# Patient Record
Sex: MALE | Race: WHITE
[De-identification: names, ages, dates, MRNs, and addresses within clinical notes are randomized per-mention and may not be internally consistent; named-entity substitution may affect disease eponyms.]

---

## 2019-10-03 ENCOUNTER — HOSPITAL ENCOUNTER (INPATIENT)
Dept: HOSPITAL 95 - ER | Age: 64
LOS: 18 days | Discharge: HOME | DRG: 329 | End: 2019-10-21
Attending: SURGERY | Admitting: SURGERY
Payer: OTHER GOVERNMENT

## 2019-10-03 VITALS — BODY MASS INDEX: 21.19 KG/M2 | HEIGHT: 69.02 IN | WEIGHT: 143.08 LBS

## 2019-10-03 DIAGNOSIS — K91.89: ICD-10-CM

## 2019-10-03 DIAGNOSIS — C18.0: Primary | ICD-10-CM

## 2019-10-03 DIAGNOSIS — D64.9: ICD-10-CM

## 2019-10-03 DIAGNOSIS — R20.0: ICD-10-CM

## 2019-10-03 DIAGNOSIS — D47.3: ICD-10-CM

## 2019-10-03 DIAGNOSIS — G25.81: ICD-10-CM

## 2019-10-03 DIAGNOSIS — G45.9: ICD-10-CM

## 2019-10-03 DIAGNOSIS — R47.81: ICD-10-CM

## 2019-10-03 DIAGNOSIS — G93.9: ICD-10-CM

## 2019-10-03 DIAGNOSIS — R29.810: ICD-10-CM

## 2019-10-03 DIAGNOSIS — E03.9: ICD-10-CM

## 2019-10-03 DIAGNOSIS — K56.7: ICD-10-CM

## 2019-10-03 DIAGNOSIS — R53.1: ICD-10-CM

## 2019-10-03 DIAGNOSIS — K35.33: ICD-10-CM

## 2019-10-03 DIAGNOSIS — F17.210: ICD-10-CM

## 2019-10-03 PROCEDURE — C9113 INJ PANTOPRAZOLE SODIUM, VIA: HCPCS

## 2019-10-03 PROCEDURE — C1751 CATH, INF, PER/CENT/MIDLINE: HCPCS

## 2019-10-03 PROCEDURE — A9577 INJ MULTIHANCE: HCPCS

## 2019-10-03 NOTE — NUR
@1930
PATIENT ADMITTED FROM THE ER WITH HIS BROTHER. HE IS HAVING PAIN 8/10 IN HIS
LOW ABDOMEN/PELVIS. HE IS NOT CURRENTLY NAUSEATED, HE WOULD LIKE TO EAT
SOMETHING TONIGHT. ORIENTED TO ROOM AND NORMAL PROCEDURES TO EXPECT
THROUGHTOUT THE NIGHT. CALL LIGHT WITHIN REACH.
@2200 pATIENT CALLED RN TO ROOM WITH C/O 9/10 PAIN IN HIS PENIS AND RECTUM,
SUDDEN ONSET WHEN PATIENT ATTEMPTED TP PASS GAS. THE PAIN HAS NOT SUBSIDED
WITH TIME. MEDICATIONS GIVE WITH GOOD RELIEF.

## 2019-10-04 PROCEDURE — 0DTJ0ZZ RESECTION OF APPENDIX, OPEN APPROACH: ICD-10-PCS | Performed by: SURGERY

## 2019-10-04 PROCEDURE — 0WJG4ZZ INSPECTION OF PERITONEAL CAVITY, PERCUTANEOUS ENDOSCOPIC APPROACH: ICD-10-PCS | Performed by: SURGERY

## 2019-10-04 PROCEDURE — 0DTH0ZZ RESECTION OF CECUM, OPEN APPROACH: ICD-10-PCS | Performed by: SURGERY

## 2019-10-04 NOTE — NUR
ARRIVED BACK TO ROOM 232 FROM PACU S/P OPEN APPY PT HAS ROSA  WOUND VAC SMALL
AMT OF SEROSANG DRAINAGE PT REPORTS PAIN PRIOR TO MOVING 5/10 10/10 AFTER
MOVING INTO THE BED WITH ASSIST PT HAS NAUSEA TAKING SIPS ONLY OF H20 CL TRAY
ON HOLD UNILL NAUSEA IS BETTER MEDS GIVEN IN PACU

## 2019-10-04 NOTE — NUR
INTO [facility] VIA [employer]. PT REPORTS 2/10 ABDOMINAL PAIN. DENIES NAUSEA. HISTORY
AND ALLERGIES REVIEWED. NPO STATUS CONFIRMED. LUNGS WITH SCATTERED RHONCHI AND
WZ. RHONCHI CLEAR SOMEWHAT WITH COUGH-PT IS A SMOKER.

## 2019-10-04 NOTE — NUR
Advance directive education conducted. Patient is recovering from surgery but
still fairly alert with brother, Winston, bedside. Patient and Winston were
delighted to receive an advance directive booklet. Winston tells me he was in on
the process for his folks and his brother that passed away in fill out their
advance care planning forms. He stated that he knew its importance and how to
fill it out. He thanked me for providing the booklet.

## 2019-10-04 NOTE — NUR
pt awake reports pain 5/10 lower abd rad to r occ to the groin no nausea at
this time but stated he has had it off and on sched for or this am npo

## 2019-10-05 LAB
ANION GAP SERPL CALCULATED.4IONS-SCNC: 4 MMOL/L (ref 6–16)
BASOPHILS # BLD AUTO: 0.02 K/MM3 (ref 0–0.23)
BASOPHILS NFR BLD AUTO: 0 % (ref 0–2)
BUN SERPL-MCNC: 18 MG/DL (ref 8–24)
CALCIUM SERPL-MCNC: 7.7 MG/DL (ref 8.5–10.1)
CHLORIDE SERPL-SCNC: 105 MMOL/L (ref 98–108)
CO2 SERPL-SCNC: 27 MMOL/L (ref 21–32)
CREAT SERPL-MCNC: 0.92 MG/DL (ref 0.6–1.2)
DEPRECATED RDW RBC AUTO: 50.9 FL (ref 35.1–46.3)
EOSINOPHIL # BLD AUTO: 0 K/MM3 (ref 0–0.68)
EOSINOPHIL NFR BLD AUTO: 0 % (ref 0–6)
ERYTHROCYTE [DISTWIDTH] IN BLOOD BY AUTOMATED COUNT: 15.6 % (ref 11.7–14.2)
GLUCOSE SERPL-MCNC: 120 MG/DL (ref 70–99)
HCT VFR BLD AUTO: 23.6 % (ref 37–53)
HGB BLD-MCNC: 7.4 G/DL (ref 13.5–17.5)
IMM GRANULOCYTES # BLD AUTO: 0.04 K/MM3 (ref 0–0.1)
IMM GRANULOCYTES NFR BLD AUTO: 0 % (ref 0–1)
LYMPHOCYTES # BLD AUTO: 0.5 K/MM3 (ref 0.84–5.2)
LYMPHOCYTES NFR BLD AUTO: 4 % (ref 21–46)
MAGNESIUM SERPL-MCNC: 2 MG/DL (ref 1.6–2.4)
MCHC RBC AUTO-ENTMCNC: 31.4 G/DL (ref 31.5–36.5)
MCV RBC AUTO: 90 FL (ref 80–100)
MONOCYTES # BLD AUTO: 0.54 K/MM3 (ref 0.16–1.47)
MONOCYTES NFR BLD AUTO: 4 % (ref 4–13)
NEUTROPHILS # BLD AUTO: 12.18 K/MM3 (ref 1.96–9.15)
NEUTROPHILS NFR BLD AUTO: 92 % (ref 41–73)
NRBC # BLD AUTO: 0 K/MM3 (ref 0–0.02)
NRBC BLD AUTO-RTO: 0 /100 WBC (ref 0–0.2)
PHOSPHATE SERPL-MCNC: 2.3 MG/DL (ref 2.5–4.9)
PLATELET # BLD AUTO: 296 K/MM3 (ref 150–400)
POTASSIUM SERPL-SCNC: 4.3 MMOL/L (ref 3.5–5.5)
SODIUM SERPL-SCNC: 136 MMOL/L (ref 136–145)

## 2019-10-05 NOTE — NUR
SUMMARY
NO ACUTE CHANGES T/O SHIFT.  MEDICATED PER ORDERS T/O SHIFT FOR ABD PAIN.
TOLERATING CLEAR LIQUIDS, REPORTS HUNGRY BUT DENIES PASSING FLATUS.  BT
PRESENT.  PT AMBULATED IN NGO THIS AM AND HAS SAT IN RECLINER FOR T/O SHIFT.
JUNAID BAEZ THIS AM, PT REPORTS HAS VOIDED IN TOILET TWICE.  BROTHER AT
BEDSIDE.  CALL LIGHT IN REACH.  IV INFUSING W/O DIFFICULTY.

## 2019-10-06 NOTE — NUR
PAIN MANAGEMENT
PT REPORTS CONTINUOUS UNTOLERABLE ABD PAIN DESPITE CURRENT PAIN RELIEF
MEASURES AND MEDICATIONS. PT IS CURRENTLY AMBULATING IN HALLWAY AND REPORTS
PASSING FLATUS T/O THE MORNING. LEFT MESSAGE FOR PROVIDER TO DISCUSS PT'S
PAIN SITUATION.

## 2019-10-06 NOTE — NUR
SHIFT SUMMARY
PT A&0X4 WITH VSS TODAY. IND IN ROOM WITH STAND BY TO BATHROOM R/T IV AND
PAIN MEDS. REFUSES PAS STOCKINGS, NEW ORDER OBTAINED FOR LOVENOX.  VOIDING
WITHOUT DIFFICULTY AND REPORTS PASSING FLATUS T/O SHIFT; DECLINES BM.
AMBULATED TWICE IN THE HALLWAY TODAY. TOLERATING FL DIET; DENIES ANY N/V OR
ABD DISCOMFORT R/T INTAKE. ROSA DRESSING DRY AND INTACT W/ VAC IN PLACE; NO
NEW DRAINAGE NOTED. PAIN CONTROLLED WELL PER EMAR AND REPOSITIONING. BROTHER
AT BEDSIDE T/O THE AFTERNOON. CALL LIGHT WITHIN REACH AND ABLE TO USE
APPROPRIATELY.

## 2019-10-06 NOTE — NUR
SUMMARY
 PT UP IN ROOM SBA.VOIDING WITHOUT DIFF. MED THIS AM WITH TORADOL.PT USUALLY
RATES PAIN HIGH, BUT REPORTS LEVEL AS OK.PT ANXIOUS FOR DIET TO BE ADVANCED.
HAS PASSED FLATUS. DAY RN AGREES TO FOLLOW UP. PT NOT ON THINNERS. DAY RN WILL
FOLLOW UP ? THIS.PT REQUIRED NEW IV SITE THIS AM.

## 2019-10-07 LAB
ANION GAP SERPL CALCULATED.4IONS-SCNC: 3 MMOL/L (ref 6–16)
BASOPHILS # BLD AUTO: 0.01 K/MM3 (ref 0–0.23)
BASOPHILS NFR BLD AUTO: 0 % (ref 0–2)
BUN SERPL-MCNC: 11 MG/DL (ref 8–24)
CALCIUM SERPL-MCNC: 7.6 MG/DL (ref 8.5–10.1)
CHLORIDE SERPL-SCNC: 103 MMOL/L (ref 98–108)
CO2 SERPL-SCNC: 31 MMOL/L (ref 21–32)
CREAT SERPL-MCNC: 0.81 MG/DL (ref 0.6–1.2)
DEPRECATED RDW RBC AUTO: 50.2 FL (ref 35.1–46.3)
EOSINOPHIL # BLD AUTO: 0.03 K/MM3 (ref 0–0.68)
EOSINOPHIL NFR BLD AUTO: 0 % (ref 0–6)
ERYTHROCYTE [DISTWIDTH] IN BLOOD BY AUTOMATED COUNT: 15.3 % (ref 11.7–14.2)
GLUCOSE SERPL-MCNC: 99 MG/DL (ref 70–99)
HCT VFR BLD AUTO: 24.6 % (ref 37–53)
HGB BLD-MCNC: 7.8 G/DL (ref 13.5–17.5)
IMM GRANULOCYTES # BLD AUTO: 0.03 K/MM3 (ref 0–0.1)
IMM GRANULOCYTES NFR BLD AUTO: 0 % (ref 0–1)
LYMPHOCYTES # BLD AUTO: 1.04 K/MM3 (ref 0.84–5.2)
LYMPHOCYTES NFR BLD AUTO: 11 % (ref 21–46)
MAGNESIUM SERPL-MCNC: 1.8 MG/DL (ref 1.6–2.4)
MCHC RBC AUTO-ENTMCNC: 31.7 G/DL (ref 31.5–36.5)
MCV RBC AUTO: 89 FL (ref 80–100)
MONOCYTES # BLD AUTO: 0.57 K/MM3 (ref 0.16–1.47)
MONOCYTES NFR BLD AUTO: 6 % (ref 4–13)
NEUTROPHILS # BLD AUTO: 8.23 K/MM3 (ref 1.96–9.15)
NEUTROPHILS NFR BLD AUTO: 83 % (ref 41–73)
NRBC # BLD AUTO: 0 K/MM3 (ref 0–0.02)
NRBC BLD AUTO-RTO: 0 /100 WBC (ref 0–0.2)
PHOSPHATE SERPL-MCNC: 3.1 MG/DL (ref 2.5–4.9)
PLATELET # BLD AUTO: 361 K/MM3 (ref 150–400)
POTASSIUM SERPL-SCNC: 3.9 MMOL/L (ref 3.5–5.5)
SODIUM SERPL-SCNC: 137 MMOL/L (ref 136–145)

## 2019-10-07 NOTE — NUR
SHIFT SUMMARY
PT HAS BEEN UP TO BATHROOM TODAY AND SHOWERED. PAIN MANAGED WITH MEDS PER
ORDERS. TOLERATING FLUIDS WELL. DRESSINGS DRY AND INTACT. PT VOIDING, REPORTS
BM'S. PT HAD PRN INHALER/BREATHING TREATMENT WITH RT TODAY. HAS HX OF COPD. PT
AO, INDEPENDANT IN . HAD VISITORS TODAY. ASSISTED WITH ADL'S PRN.

## 2019-10-07 NOTE — NUR
SUMMARY
SLEP QUIETLY TONIGHT. REPORTS PAIN HAS HAD BETTER CONTROL TONIGHT.NO C/O
NAUSEA. VOIDING WITHOUT DIFF.

## 2019-10-08 NOTE — NUR
SHIFT SUMMARY
PT HAD VISITOR TODAY. HAS REPORTED PAIN CONTROLLED WITH PO PAIN MEDS. PT DID
HAVE SOME NAUSEA AFTER EATING SOLID FOOD. PO ZOFRAN GIVEN PER EMAR. PT REPORTS
THIS HELPED. PT IND IN RM. TOLERATING FLUIDS WELL AND VOIDING. ASSISTED WTIH
ADL'S PRN.

## 2019-10-08 NOTE — NUR
SUMMARY: POD 4 ILEOCECECTOMY. VSS, AFEBRILE, PASSING GAS AND SMALL LOOSE
STOOL. TOLERATING PO INTAKE AND PAIN WELL CONTROLLED WITH 10MG PO ROXICODONE.
ANTICIPATE DC HOME LATER THIS DAY.

## 2019-10-09 NOTE — NUR
pt reporting nausea still with the reg diet zofran given pre med before
breakfast pt reports abd pain 5/10 at this time will med after food to
decrease the nausea pt stated he is passing flatus abd had a bm

## 2019-10-10 NOTE — NUR
SHIFT SUMMARY
PT A&OX4 WITH VSS T/O SHIFT TODAY. REPORTS NOT PASSING FLATUS SINCE THIS
MORNING. NEW ORDERS OBTAINED AND MEDICATED PER EMAR. PT AMBULATED IND IN ROOM,
HALLWAYS, AND OUTSIDE TODAY. BROTHER AT BEDSIDE FOR MOST OF THE SHIFT. C/O
PAIN ONCE TODAY; PO PAIN MEDICATIONS GIVEN.

## 2019-10-10 NOTE — NUR
Pt Alert and oriented x4. VSS. COmplaints of mild abd pain and hiccups.
Passing gas but no BM. Given miralax; no results. Ambulating in room
independently. medicated as ordered.

## 2019-10-11 LAB
ANION GAP SERPL CALCULATED.4IONS-SCNC: 8 MMOL/L (ref 6–16)
BASOPHILS # BLD AUTO: 0.02 K/MM3 (ref 0–0.23)
BASOPHILS NFR BLD AUTO: 0 % (ref 0–2)
BUN SERPL-MCNC: 8 MG/DL (ref 8–24)
CALCIUM SERPL-MCNC: 8.3 MG/DL (ref 8.5–10.1)
CHLORIDE SERPL-SCNC: 97 MMOL/L (ref 98–108)
CO2 SERPL-SCNC: 30 MMOL/L (ref 21–32)
CREAT SERPL-MCNC: 0.87 MG/DL (ref 0.6–1.2)
DEPRECATED RDW RBC AUTO: 50.4 FL (ref 35.1–46.3)
EOSINOPHIL # BLD AUTO: 0.03 K/MM3 (ref 0–0.68)
EOSINOPHIL NFR BLD AUTO: 0 % (ref 0–6)
ERYTHROCYTE [DISTWIDTH] IN BLOOD BY AUTOMATED COUNT: 15.9 % (ref 11.7–14.2)
GLUCOSE SERPL-MCNC: 101 MG/DL (ref 70–99)
HCT VFR BLD AUTO: 29.7 % (ref 37–53)
HGB BLD-MCNC: 9.4 G/DL (ref 13.5–17.5)
IMM GRANULOCYTES # BLD AUTO: 0.13 K/MM3 (ref 0–0.1)
IMM GRANULOCYTES NFR BLD AUTO: 1 % (ref 0–1)
LYMPHOCYTES # BLD AUTO: 1.22 K/MM3 (ref 0.84–5.2)
LYMPHOCYTES NFR BLD AUTO: 10 % (ref 21–46)
MCHC RBC AUTO-ENTMCNC: 31.6 G/DL (ref 31.5–36.5)
MCV RBC AUTO: 86 FL (ref 80–100)
MONOCYTES # BLD AUTO: 0.71 K/MM3 (ref 0.16–1.47)
MONOCYTES NFR BLD AUTO: 6 % (ref 4–13)
NEUTROPHILS # BLD AUTO: 10.07 K/MM3 (ref 1.96–9.15)
NEUTROPHILS NFR BLD AUTO: 83 % (ref 41–73)
NRBC # BLD AUTO: 0 K/MM3 (ref 0–0.02)
NRBC BLD AUTO-RTO: 0 /100 WBC (ref 0–0.2)
PLATELET # BLD AUTO: 663 K/MM3 (ref 150–400)
POTASSIUM SERPL-SCNC: 4.3 MMOL/L (ref 3.5–5.5)
PROTHROMBIN TIME: 12.2 SEC (ref 9.7–11.5)
SODIUM SERPL-SCNC: 135 MMOL/L (ref 136–145)

## 2019-10-11 NOTE — NUR
PT ALERT AND ORIENTED. VSS. AMBULATING IN ROOM. ABDOMEN DISTENDED; PATIENT
COMPLAINT OF PAIN AND HICCUPS PASSING GAS. NO BM, DULCOLAX SUPP GIVEN;
AWAITING RESULTS.

## 2019-10-11 NOTE — NUR
SHIFT SUMMARY
HAD C/T TODAY THAT SHOWED ABSCESS. ORDERS OBTAINED FOR PERCUTANEOUS DRAIN.
CURRENTLY IN IMAGING FOR PLACEMENT. HAD ONE EPISODE OF SOB AND ANXIOUSNESS
THIS MORNING; O2 SATS STABLE AND RECEIVED BREATHING TREATMENT; NO NEW
COMPLAINTS. ON CL DIET. IND IN ROOM AND WITH VISITOR AT BEDSIDE FREQUENTLY T/O
SHIFT. HAD 2 BM TODAY.

## 2019-10-11 NOTE — NUR
PT ANXIOUS AND REPORTS SOB
PT REPORTED FEELING ANXIOUS AND SOB AT APPROX 1100 TODAY. PT WAS A&OX4, SPO2
AT 98% RA WITH RR OF 20. WHEEZES AUSCULTATED IN ISAIAH/RUL WITH COARSE CRACKLES
AT BASES. HOB ELEVATED, DEEP COUGH/BREATHS AND I.S. USE ENCOURAGED,  AND
BREATHING TREATMENT ORDERED. REPORTED "FEELING BETTER" AFTER BREATHING
TREATMENT AND DENIED ANY SOB. NO WHEEZING IN UPPER LOBES NOTED AT THIS TIME.
PT IS CURRENTLY SLEEPING IN BED.  WILL CONTINUE TO MONITOR.

## 2019-10-12 LAB
ANION GAP SERPL CALCULATED.4IONS-SCNC: 7 MMOL/L (ref 6–16)
BASOPHILS # BLD AUTO: 0.03 K/MM3 (ref 0–0.23)
BASOPHILS NFR BLD AUTO: 0 % (ref 0–2)
BUN SERPL-MCNC: 12 MG/DL (ref 8–24)
CALCIUM SERPL-MCNC: 8 MG/DL (ref 8.5–10.1)
CHLORIDE SERPL-SCNC: 101 MMOL/L (ref 98–108)
CO2 SERPL-SCNC: 28 MMOL/L (ref 21–32)
CREAT SERPL-MCNC: 0.78 MG/DL (ref 0.6–1.2)
DEPRECATED RDW RBC AUTO: 47.2 FL (ref 35.1–46.3)
EOSINOPHIL # BLD AUTO: 0.01 K/MM3 (ref 0–0.68)
EOSINOPHIL NFR BLD AUTO: 0 % (ref 0–6)
ERYTHROCYTE [DISTWIDTH] IN BLOOD BY AUTOMATED COUNT: 15.9 % (ref 11.7–14.2)
GLUCOSE SERPL-MCNC: 141 MG/DL (ref 70–99)
HCT VFR BLD AUTO: 25.8 % (ref 37–53)
HGB BLD-MCNC: 8.5 G/DL (ref 13.5–17.5)
IMM GRANULOCYTES # BLD AUTO: 0.17 K/MM3 (ref 0–0.1)
IMM GRANULOCYTES NFR BLD AUTO: 1 % (ref 0–1)
LYMPHOCYTES # BLD AUTO: 1.28 K/MM3 (ref 0.84–5.2)
LYMPHOCYTES NFR BLD AUTO: 9 % (ref 21–46)
MAGNESIUM SERPL-MCNC: 2.5 MG/DL (ref 1.6–2.4)
MCHC RBC AUTO-ENTMCNC: 32.9 G/DL (ref 31.5–36.5)
MCV RBC AUTO: 82 FL (ref 80–100)
MONOCYTES # BLD AUTO: 1 K/MM3 (ref 0.16–1.47)
MONOCYTES NFR BLD AUTO: 7 % (ref 4–13)
NEUTROPHILS # BLD AUTO: 11.64 K/MM3 (ref 1.96–9.15)
NEUTROPHILS NFR BLD AUTO: 82 % (ref 41–73)
NRBC # BLD AUTO: 0 K/MM3 (ref 0–0.02)
NRBC BLD AUTO-RTO: 0 /100 WBC (ref 0–0.2)
PHOSPHATE SERPL-MCNC: 2.8 MG/DL (ref 2.5–4.9)
PLATELET # BLD AUTO: 641 K/MM3 (ref 150–400)
POTASSIUM SERPL-SCNC: 3.8 MMOL/L (ref 3.5–5.5)
SODIUM SERPL-SCNC: 136 MMOL/L (ref 136–145)
TRIGL SERPL-MCNC: 175 MG/DL (ref 30–160)

## 2019-10-12 NOTE — NUR
SHIFT SUMMARY
PT IS IND IN RM. HAS REPORTED SOME PAIN TODAY AND WAS MEDICATED PER ORDERS.
TOLERATING CLEAR LIQUID DIET WELL. FAMILY HAS BEEN IN TO SEE PT.  WAS IN TO
SEE PT TODAY AND CHECKED DRAIN AND WOUND VAC. PT ASSISTED WITH ADL'S PRN.

## 2019-10-12 NOTE — NUR
SHIFT SUMMARY
PT POD#8. AAOX4. DISCOMFORT AT TOLERABLE LEVEL T/O NIGHT. NO NAUSEA/EMESIS.
ABD INCISION WITH ROSA SECURE/COMPRESSED WITH NO DRAINAGE. PERCUTANIOUS DRAIN
PLACE YESTARDAY, SCANT AMOUNT DRAINAGE NOTED. PT RESTED WELL T/O NIGHT.
CLINIMIX + IV ABX PER ORDERS. PT RESTING WELL THIS AM, CALL LIGHT IN REACH.

## 2019-10-13 LAB
MAGNESIUM SERPL-MCNC: 2.3 MG/DL (ref 1.6–2.4)
PHOSPHATE SERPL-MCNC: 3 MG/DL (ref 2.5–4.9)

## 2019-10-13 NOTE — NUR
SHIFT SUMMARY
PT WAS ADVANCED TO REGULAR DIET TODAY AND TOLERATED WELL. REPORTED NO NAUSEA
AFTER EATING. PAIN HAS BEEN MANAGED WITH MED PRN. PT HAS VOIDED AND HAD BM
TODAY. IND IN ROOM. PT HAD FAMILY IN TODAY. ASSISTED WT ADL'S PRN.

## 2019-10-13 NOTE — NUR
SHIFT SUMMARY:
JASE IS POD9 AFTER UNDERGOING AN APPENDECTOMY AND BOWEL RESECTION. HIS ROSA
WOUND VAC IS PATENT. HE HAS A DRAIN TO HIS LEFT LOWER FLANK WHICH HAS A SCANT
AMOUNT OF SEROSANGEINOUS FLUID. HE RATED HIS PAIN AT 7/10 FOR WHICH ROXICODONE
WAS EFFECTIVE. HE DENIES ANY NAUSEA OR VOMITING. HE IS INDEPENDENT IN HIS
ROOM. HE IS TOLERATING THE CLEAR LIQID DIET WELL. HE IS LYING IN BED WITH HIS
CALL LIGHT IN REACH.

## 2019-10-14 LAB
BASOPHILS # BLD AUTO: 0.05 K/MM3 (ref 0–0.23)
BASOPHILS NFR BLD AUTO: 0 % (ref 0–2)
DEPRECATED RDW RBC AUTO: 51.3 FL (ref 35.1–46.3)
EOSINOPHIL # BLD AUTO: 0.11 K/MM3 (ref 0–0.68)
EOSINOPHIL NFR BLD AUTO: 1 % (ref 0–6)
ERYTHROCYTE [DISTWIDTH] IN BLOOD BY AUTOMATED COUNT: 16.5 % (ref 11.7–14.2)
HCT VFR BLD AUTO: 26 % (ref 37–53)
HGB BLD-MCNC: 8.1 G/DL (ref 13.5–17.5)
IMM GRANULOCYTES # BLD AUTO: 0.21 K/MM3 (ref 0–0.1)
IMM GRANULOCYTES NFR BLD AUTO: 2 % (ref 0–1)
LYMPHOCYTES # BLD AUTO: 1.79 K/MM3 (ref 0.84–5.2)
LYMPHOCYTES NFR BLD AUTO: 13 % (ref 21–46)
MAGNESIUM SERPL-MCNC: 2.2 MG/DL (ref 1.6–2.4)
MCHC RBC AUTO-ENTMCNC: 31.2 G/DL (ref 31.5–36.5)
MCV RBC AUTO: 85 FL (ref 80–100)
MONOCYTES # BLD AUTO: 0.8 K/MM3 (ref 0.16–1.47)
MONOCYTES NFR BLD AUTO: 6 % (ref 4–13)
NEUTROPHILS # BLD AUTO: 10.64 K/MM3 (ref 1.96–9.15)
NEUTROPHILS NFR BLD AUTO: 78 % (ref 41–73)
NRBC # BLD AUTO: 0 K/MM3 (ref 0–0.02)
NRBC BLD AUTO-RTO: 0 /100 WBC (ref 0–0.2)
PHOSPHATE SERPL-MCNC: 3.5 MG/DL (ref 2.5–4.9)
PLATELET # BLD AUTO: 552 K/MM3 (ref 150–400)

## 2019-10-14 NOTE — NUR
SUMMARY: NO ACUTE CHANGE TODAY, VSS, A/O. PT INDEPENDENT IN ROOM. VOIDING AND
HAD BM TODAY. PAIN IS WELL MANAGED, PLAN IS POSSIBLE DISCHARGE TOMORROW.

## 2019-10-14 NOTE — NUR
SHIFT SUMMARY:
IRISH IS A&O X4, INDEPENDENT IN HIS ROOM AND TOLERATING THE REGULAR DIET
WELL. HE DENIES ANY N/V. HE IS ABLE TO MAKE HIS NEEDS KNOWN. HE COMPLAINS OF
8/10 PAIN THAT IS HELPED WITH THE ROXICODONE. HIS CALL LIGHT IS IN REACH. NO
ACUTE CHANGES THIS SHIFT.

## 2019-10-15 NOTE — NUR
PT VSS T/O NIGHT. INCISION CDI. PT MED FOR PAIN X1 W/REP RELIEF. PT DID HAVE 2
EPISODES OF N/V W/GREEN EMESIS. ABD SOFT, T-TUBE PUTTING OUT SMALL AMT SS
DNRG. PT REP +FLATUS, NO BM THIS SHIFT. PT UP INDEP IN ROOM, USING CALL LIGHT
FOR ASSISTANCE, IV ABX CONT PER ORDERS. WILL CONT TO MONITOR UNTIL REP GIVEN
TO ONCOMING RN.

## 2019-10-15 NOTE — NUR
AT ABOUT 1630 PT CALLED THIS RN INTO ROOM. PT REPORTS THAT HIS R CHEEK IS NUMB
AND THAT THE RIGHT SIDE OF HIS FACE IS DROOPING. FULL NEURO ASSESSMENT
COMPLETED WITHOUT ANY ACUTE FINDINGS. PT HAS EQUAL STRENGTH, PUPILS WNL, IS
A/O. DENIES ANY NUMBNESS/TINGLING EXCEPT IN R CHEEK. R FACIL DROOP AND SMALL
AMOUNT SLURRED SPEECH IS NOTED ON ASSESSMENT, BUT AS THIS RN IS SPEAKING WITH
THE PT, THE DROOP BECOMES LESS, PT SPEECH BECOMES MORE CLEAR AND PT REPORTS
FEELING COMING BACK INTO R CHEEK. VS ARE STABLE.
DR. PIRES MADE AWARE OF THIS AND HOSPITALIST, DR. SANDOVALTRATE CALLED FOR CONSULT.

## 2019-10-15 NOTE — NUR
SUMMARY CONTINUED: SURGICAL SITES WNL. PT INDEPENDENT IN ROOM.  ISTRATE
ABLE TO SEE PT AND IMAGING ORDERED, AWAITING RESULTS. WILL PASS REPORT TO BRIAN ABRAHAM.

## 2019-10-15 NOTE — NUR
SUMMARY: PT HAS HAD AN ODD DAY. SEE PREVIOUS NOTE. HAS HAD INTERMITTANT NAUSEA
ALL DAY AND REPORTS "FEELING SICK". HAS HAD A TOTAL OF 1800ML EMESIS. PT
REPORT IV ZOFRAN DOES NOT HELP WITH NAUSEA, DR. PIRES NOTIFIED OF PT NAUSEA
AND REGLAN ORDERED.

## 2019-10-16 LAB
ALBUMIN SERPL BCP-MCNC: 2.3 G/DL (ref 3.4–5)
ALBUMIN/GLOB SERPL: 0.5 {RATIO} (ref 0.8–1.8)
ALT SERPL W P-5'-P-CCNC: 44 U/L (ref 12–78)
ANION GAP SERPL CALCULATED.4IONS-SCNC: 8 MMOL/L (ref 6–16)
AST SERPL W P-5'-P-CCNC: 36 U/L (ref 12–37)
BASOPHILS # BLD AUTO: 0.07 K/MM3 (ref 0–0.23)
BASOPHILS NFR BLD AUTO: 1 % (ref 0–2)
BILIRUB SERPL-MCNC: 0.5 MG/DL (ref 0.1–1)
BUN SERPL-MCNC: 23 MG/DL (ref 8–24)
CALCIUM SERPL-MCNC: 8.9 MG/DL (ref 8.5–10.1)
CHLORIDE SERPL-SCNC: 97 MMOL/L (ref 98–108)
CO2 SERPL-SCNC: 31 MMOL/L (ref 21–32)
CREAT SERPL-MCNC: 1.06 MG/DL (ref 0.6–1.2)
DEPRECATED RDW RBC AUTO: 50.4 FL (ref 35.1–46.3)
EOSINOPHIL # BLD AUTO: 0.04 K/MM3 (ref 0–0.68)
EOSINOPHIL NFR BLD AUTO: 0 % (ref 0–6)
ERYTHROCYTE [DISTWIDTH] IN BLOOD BY AUTOMATED COUNT: 16.5 % (ref 11.7–14.2)
GLOBULIN SER CALC-MCNC: 5 G/DL (ref 2.2–4)
GLUCOSE SERPL-MCNC: 114 MG/DL (ref 70–99)
HCT VFR BLD AUTO: 29.7 % (ref 37–53)
HGB BLD-MCNC: 9.6 G/DL (ref 13.5–17.5)
IMM GRANULOCYTES # BLD AUTO: 0.14 K/MM3 (ref 0–0.1)
IMM GRANULOCYTES NFR BLD AUTO: 1 % (ref 0–1)
LYMPHOCYTES # BLD AUTO: 1.59 K/MM3 (ref 0.84–5.2)
LYMPHOCYTES NFR BLD AUTO: 12 % (ref 21–46)
MCHC RBC AUTO-ENTMCNC: 32.3 G/DL (ref 31.5–36.5)
MCV RBC AUTO: 84 FL (ref 80–100)
MONOCYTES # BLD AUTO: 0.91 K/MM3 (ref 0.16–1.47)
MONOCYTES NFR BLD AUTO: 7 % (ref 4–13)
NEUTROPHILS # BLD AUTO: 11.07 K/MM3 (ref 1.96–9.15)
NEUTROPHILS NFR BLD AUTO: 80 % (ref 41–73)
NRBC # BLD AUTO: 0 K/MM3 (ref 0–0.02)
NRBC BLD AUTO-RTO: 0 /100 WBC (ref 0–0.2)
PLATELET # BLD AUTO: 781 K/MM3 (ref 150–400)
POTASSIUM SERPL-SCNC: 3.7 MMOL/L (ref 3.5–5.5)
PROT SERPL-MCNC: 7.3 G/DL (ref 6.4–8.2)
SODIUM SERPL-SCNC: 136 MMOL/L (ref 136–145)

## 2019-10-16 NOTE — NUR
PT VSS T/O NIGHT. PAIN MGD W/PO PAIN MEDS W/REP RELIEF. PT CONT TO HAVE NAUSEA
W/2 EPISODES OF SM AMT EMESIS. PT REFUSING NASUEA MEDS, STATING "I THINK THEY
MAKE IT WORSE" PT REP +FLATUS NAD 1 LARGE BM THIS SHIFT. PO INTAKE MINIMAL,
CLINIMIX CONT PER ORDERS. MINIMAL SS DRNG FROM T-TUBE. WILL CONT TO MONITOR
UNTIL REP GIVEN TO ONCOMING RN.

## 2019-10-16 NOTE — NUR
pt ref meds for bm stated that he had two bm yesterday 1 lg 1 small 1 episode
of emesis this am offered zofran pt declined offered tums stated ok pt stated
no residual symptoms from yesterday had a droop for several min and garbled
speech no other symptoms pt pig tail drain has red liquid drainage small amt
some abd dist

## 2019-10-16 NOTE — NUR
dr morris by to see pt going over dx/tx and mri results pt brother also there
pt's sister req to be called

## 2019-10-17 LAB
BASOPHILS # BLD: 0 K/MM3 (ref 0–0.23)
BASOPHILS NFR BLD: 0 % (ref 0–2)
DEPRECATED RDW RBC AUTO: 51.3 FL (ref 35.1–46.3)
EOSINOPHIL # BLD: 0 K/MM3 (ref 0–0.68)
EOSINOPHIL NFR BLD: 0 % (ref 0–6)
ERYTHROCYTE [DISTWIDTH] IN BLOOD BY AUTOMATED COUNT: 16.6 % (ref 11.7–14.2)
HCT VFR BLD AUTO: 28.2 % (ref 37–53)
HGB BLD-MCNC: 9.1 G/DL (ref 13.5–17.5)
LYMPHOCYTES # BLD: 1.9 K/MM3 (ref 0.84–5.2)
LYMPHOCYTES NFR BLD: 15 % (ref 21–46)
MAGNESIUM SERPL-MCNC: 2.5 MG/DL (ref 1.6–2.4)
MCHC RBC AUTO-ENTMCNC: 32.3 G/DL (ref 31.5–36.5)
MCV RBC AUTO: 86 FL (ref 80–100)
METAMYELOCYTES # BLD MANUAL: 0.12 K/MM3 (ref 0–0)
METAMYELOCYTES NFR BLD MANUAL: 1 % (ref 0–0)
MONOCYTES # BLD: 1.52 K/MM3 (ref 0.16–1.47)
MONOCYTES NFR BLD: 12 % (ref 4–13)
NEUTS BAND NFR BLD MANUAL: 1 % (ref 0–8)
NEUTS SEG # BLD MANUAL: 9.16 K/MM3 (ref 1.96–9.15)
NEUTS SEG NFR BLD MANUAL: 71 % (ref 41–73)
NRBC # BLD AUTO: 0 K/MM3 (ref 0–0.02)
NRBC BLD AUTO-RTO: 0 /100 WBC (ref 0–0.2)
PHOSPHATE SERPL-MCNC: 4.4 MG/DL (ref 2.5–4.9)
PLATELET # BLD AUTO: 820 K/MM3 (ref 150–400)
TOTAL CELLS COUNTED BLD: 100

## 2019-10-17 NOTE — NUR
rounding:
dr in to see patient and make some medication changes. stool softeners held
r/t multiple soft bm's. ct of chest ordered for today. am labs ordered.

## 2019-10-17 NOTE — NUR
ASSESSMENT:
PT SLEEPING. RESP E/U. NO S/S PAIN OR DISTRESS. CALL LIGHT IN REACH. WILL
ALLOW REST AND FULLY ASSESS WHEN PT IS AWAKE.

## 2019-10-17 NOTE — NUR
SHIFT SUMMARY:
IRISH IS RECOVERING AFTER UNDERGOING SURGERY FOR A PERFORATED APPENDIX WITH
CONVERSION TO OPEN PROCEDURE WITH ILEOCECECTOMY. THAT SURGERY WAS PERFORMED
10/04/19. AN ABSCESS DEVELOPED AND A DRAIN WAS PLACED 10/11/19. HE DENIES
VOMITING AT THIS TIME. HE DOES REPORT SOME MILD NAUSEA BUT HIS MAIN COMPLAINT
WAS EPIGASTRIC PAIN WHICH WAS RESOLVED WITH A GI COCKTAIL. HIS ABDOMINAL
INCISION IS MARANDA, NO DRAINAGE OR S/S OF INFECTION. VSS. HE IS LYING IN BED WITH
HIS CALL LIGHT IN REACH. HE IS INDEPENDENT IN THE ROOM.

## 2019-10-17 NOTE — NUR
WOUND: DR PIRES IN TO SEE PATIENT. STAPLES REMOVED FROM SURGICAL SITES PER
VERBAL ORDER. PT NATHAN WELL.

## 2019-10-17 NOTE — NUR
PT HAS BEEN STABLE THIS SHIFT. PT HAD CT OF CHEST THIS AM. PT IN [place]
ISOLATION R/T R/O OF TB, LAB PENDING. SPUTUM SENT FOR [employer], BACTERIA AND
FUNGUS. PT HAS WHEEZES T/O LUNGS WITH OCCASIONAL DRY COUGH. PT UP INDEP IN
ROOM. PT CONT TO SMOKE. STAPLES REMOVED FROM SURGICAL SITES. PT HAS POOR
APPETITE, CONT CLINIMIX. VOIDING WELL WITH BR PRIVILEGES. PROTONIX STARTED FOR
GERD COMPLAINTS. NO NAUSEA OR EMESIS THIS SHIFT. BROTHER AT BEDSIDE. USES CALL
LIGHT APPROPRIATELY AS NEEDED.

## 2019-10-17 NOTE — NUR
RESPIRATORY:
PT HAS BEEN PLACED IN [place] ISOLATION FOR TB PRECAUTIONS. EXPLAINED TO
FAMILY AND PATIENT. PULMONOLOGY CONSULT CALLED.

## 2019-10-18 LAB
ANION GAP SERPL CALCULATED.4IONS-SCNC: 7 MMOL/L (ref 6–16)
BASOPHILS # BLD: 0 K/MM3 (ref 0–0.23)
BASOPHILS NFR BLD: 0 % (ref 0–2)
BUN SERPL-MCNC: 26 MG/DL (ref 8–24)
CALCIUM SERPL-MCNC: 8.3 MG/DL (ref 8.5–10.1)
CHLORIDE SERPL-SCNC: 104 MMOL/L (ref 98–108)
CO2 SERPL-SCNC: 25 MMOL/L (ref 21–32)
CREAT SERPL-MCNC: 0.81 MG/DL (ref 0.6–1.2)
DEPRECATED RDW RBC AUTO: 52.3 FL (ref 35.1–46.3)
EOSINOPHIL # BLD: 0.12 K/MM3 (ref 0–0.68)
EOSINOPHIL NFR BLD: 1 % (ref 0–6)
ERYTHROCYTE [DISTWIDTH] IN BLOOD BY AUTOMATED COUNT: 16.9 % (ref 11.7–14.2)
GLUCOSE SERPL-MCNC: 108 MG/DL (ref 70–99)
HCT VFR BLD AUTO: 25.1 % (ref 37–53)
HGB BLD-MCNC: 8 G/DL (ref 13.5–17.5)
LYMPHOCYTES # BLD: 2.28 K/MM3 (ref 0.84–5.2)
LYMPHOCYTES NFR BLD: 17 % (ref 21–46)
MCHC RBC AUTO-ENTMCNC: 31.9 G/DL (ref 31.5–36.5)
MCV RBC AUTO: 85 FL (ref 80–100)
MONOCYTES # BLD: 0.6 K/MM3 (ref 0.16–1.47)
MONOCYTES NFR BLD: 5 % (ref 4–13)
NEUTS SEG # BLD MANUAL: 9.02 K/MM3 (ref 1.96–9.15)
NEUTS SEG NFR BLD MANUAL: 75 % (ref 41–73)
NRBC # BLD AUTO: 0 K/MM3 (ref 0–0.02)
NRBC BLD AUTO-RTO: 0 /100 WBC (ref 0–0.2)
PLATELET # BLD AUTO: 773 K/MM3 (ref 150–400)
POTASSIUM SERPL-SCNC: 4.2 MMOL/L (ref 3.5–5.5)
SODIUM SERPL-SCNC: 136 MMOL/L (ref 136–145)
TOTAL CELLS COUNTED BLD: 100
VARIANT LYMPHS NFR BLD MANUAL: 2 % (ref 0–0)

## 2019-10-18 NOTE — NUR
pt resting meds given earlier protonix and decadron npo for bronch with
pulomonology pt stated no nausea this am feels better no drainage per t tube
drain

## 2019-10-18 NOTE — NUR
SECOND SPUTUM SAMPLE SENT TO LAB NO SALIVA ALSO SL IV PT EATING AND
DRINKING WELL NO NAUSEA ABX CHANGE TO PO

## 2019-10-18 NOTE — NUR
pulmonologist and dr brown by to see pt called day surg to cancel bronch per
his req will cont to try to obtain sputum samples need 2 more 24 hr apart per
dr brown pt may d/c home surg cleared but must remain until samples are given
t tube drain removed earlier dr garsia came by re tb skin test while he is
here

## 2019-10-18 NOTE — NUR
Patient alert and oriented. VSS. Ambulating to bathroom. voiding freely. No
drainage from drain. No complaints of pain. NPO since midnight. Will hold 0600
oral meds pending Endoscopy in A.M. Sputum sample collect and sent to lab.

## 2019-10-19 LAB
BASOPHILS # BLD AUTO: 0.01 K/MM3 (ref 0–0.23)
BASOPHILS NFR BLD AUTO: 0 % (ref 0–2)
DEPRECATED RDW RBC AUTO: 54.5 FL (ref 35.1–46.3)
EOSINOPHIL # BLD AUTO: 0.01 K/MM3 (ref 0–0.68)
EOSINOPHIL NFR BLD AUTO: 0 % (ref 0–6)
ERYTHROCYTE [DISTWIDTH] IN BLOOD BY AUTOMATED COUNT: 17.1 % (ref 11.7–14.2)
GAMMA INTERFERON BACKGROUND BLD IA-ACNC: 0.03 IU/ML
HCT VFR BLD AUTO: 27.5 % (ref 37–53)
HGB BLD-MCNC: 8.6 G/DL (ref 13.5–17.5)
IMM GRANULOCYTES # BLD AUTO: 0.05 K/MM3 (ref 0–0.1)
IMM GRANULOCYTES NFR BLD AUTO: 1 % (ref 0–1)
LYMPHOCYTES # BLD AUTO: 1.04 K/MM3 (ref 0.84–5.2)
LYMPHOCYTES NFR BLD AUTO: 10 % (ref 21–46)
M TB IFN-G CD4+ BCKGRND COR BLD-ACNC: 0.03 IU/ML
M TB IFN-G CD4+CD8+ BCKGRND COR BLD-ACNC: 0.03 IU/ML
MCHC RBC AUTO-ENTMCNC: 31.3 G/DL (ref 31.5–36.5)
MCV RBC AUTO: 88 FL (ref 80–100)
MITOGEN IGNF BLD-ACNC: 0.84 IU/ML
MONOCYTES # BLD AUTO: 0.27 K/MM3 (ref 0.16–1.47)
MONOCYTES NFR BLD AUTO: 3 % (ref 4–13)
NEUTROPHILS # BLD AUTO: 8.68 K/MM3 (ref 1.96–9.15)
NEUTROPHILS NFR BLD AUTO: 86 % (ref 41–73)
NRBC # BLD AUTO: 0 K/MM3 (ref 0–0.02)
NRBC BLD AUTO-RTO: 0 /100 WBC (ref 0–0.2)
PLATELET # BLD AUTO: 706 K/MM3 (ref 150–400)

## 2019-10-19 NOTE — NUR
SUMMARY: NO ACUTE CHANGE TODAY, VSS,A/0. NO S/SX OF TIA. MEDICATED FOR PAIN
PRN PER EMAR. PT HAS DENIED NAUSEA AND HAS TAKEN SERVERAL WALKS. TOLERATING
REG DIET. NO CONCERNS AT THIS TIME, PLAN IS DC ON MONDAY. WILL PASS REPORT TO
BRIAN ABRAHAM.

## 2019-10-20 LAB
BASOPHILS # BLD AUTO: 0.01 K/MM3 (ref 0–0.23)
BASOPHILS NFR BLD AUTO: 0 % (ref 0–2)
DEPRECATED RDW RBC AUTO: 52.8 FL (ref 35.1–46.3)
EOSINOPHIL # BLD AUTO: 0.01 K/MM3 (ref 0–0.68)
EOSINOPHIL NFR BLD AUTO: 0 % (ref 0–6)
ERYTHROCYTE [DISTWIDTH] IN BLOOD BY AUTOMATED COUNT: 17 % (ref 11.7–14.2)
HCT VFR BLD AUTO: 29.5 % (ref 37–53)
HGB BLD-MCNC: 9.3 G/DL (ref 13.5–17.5)
IMM GRANULOCYTES # BLD AUTO: 0.03 K/MM3 (ref 0–0.1)
IMM GRANULOCYTES NFR BLD AUTO: 0 % (ref 0–1)
LYMPHOCYTES # BLD AUTO: 1.96 K/MM3 (ref 0.84–5.2)
LYMPHOCYTES NFR BLD AUTO: 20 % (ref 21–46)
MCHC RBC AUTO-ENTMCNC: 31.5 G/DL (ref 31.5–36.5)
MCV RBC AUTO: 85 FL (ref 80–100)
MONOCYTES # BLD AUTO: 0.51 K/MM3 (ref 0.16–1.47)
MONOCYTES NFR BLD AUTO: 5 % (ref 4–13)
NEUTROPHILS # BLD AUTO: 7.39 K/MM3 (ref 1.96–9.15)
NEUTROPHILS NFR BLD AUTO: 75 % (ref 41–73)
NRBC # BLD AUTO: 0 K/MM3 (ref 0–0.02)
NRBC BLD AUTO-RTO: 0 /100 WBC (ref 0–0.2)
PLATELET # BLD AUTO: 771 K/MM3 (ref 150–400)

## 2019-10-20 NOTE — NUR
SHIFT SUMMARY
 
LYING IN SEMI FOWLERS WITH EYES CLOSED.  HAS DONE WELL THIS SHIFT.  MEDICATED
FOR PAIN X1.  EDUCATED ON DISEASE PROCESS AND POC, VOICES UNDERSTANDING.
DENEIS FURTHER NEEDS OR WANTS AT THIS TIME.  SAFETY MEASURES IN PLACE.  WILL
GIVE HAND OFF TO ONCOMING SHIFT USING SBAR.

## 2019-10-20 NOTE — NUR
SUMMARY: NO CHANGE TODAY. PT CONTINUES TO BE IN GOOD SPIRITS. BM TODAY. PAIN
MANAGED, TOLERATING DIET, FREQUENT WALKS TODAY TO SMOKE. VSS. PT IS CLEARED BY
SURGERY AND PLAN IS FOR DISCHARGE TOMORROW OF WHICH PT IS AWARE AND HAS MADE
ARRAGEMENTS. REPORT GIVEN TO JARAD MANSFIELD.

## 2019-10-21 LAB
BASOPHILS # BLD AUTO: 0.01 K/MM3 (ref 0–0.23)
BASOPHILS NFR BLD AUTO: 0 % (ref 0–2)
DEPRECATED RDW RBC AUTO: 52.4 FL (ref 35.1–46.3)
EOSINOPHIL # BLD AUTO: 0 K/MM3 (ref 0–0.68)
EOSINOPHIL NFR BLD AUTO: 0 % (ref 0–6)
ERYTHROCYTE [DISTWIDTH] IN BLOOD BY AUTOMATED COUNT: 16.9 % (ref 11.7–14.2)
HCT VFR BLD AUTO: 26.9 % (ref 37–53)
HGB BLD-MCNC: 8.4 G/DL (ref 13.5–17.5)
IMM GRANULOCYTES # BLD AUTO: 0.05 K/MM3 (ref 0–0.1)
IMM GRANULOCYTES NFR BLD AUTO: 1 % (ref 0–1)
LYMPHOCYTES # BLD AUTO: 1.55 K/MM3 (ref 0.84–5.2)
LYMPHOCYTES NFR BLD AUTO: 16 % (ref 21–46)
MCHC RBC AUTO-ENTMCNC: 31.2 G/DL (ref 31.5–36.5)
MCV RBC AUTO: 85 FL (ref 80–100)
MONOCYTES # BLD AUTO: 0.65 K/MM3 (ref 0.16–1.47)
MONOCYTES NFR BLD AUTO: 7 % (ref 4–13)
NEUTROPHILS # BLD AUTO: 7.65 K/MM3 (ref 1.96–9.15)
NEUTROPHILS NFR BLD AUTO: 77 % (ref 41–73)
NRBC # BLD AUTO: 0 K/MM3 (ref 0–0.02)
NRBC BLD AUTO-RTO: 0 /100 WBC (ref 0–0.2)
PLATELET # BLD AUTO: 695 K/MM3 (ref 150–400)

## 2019-10-21 NOTE — NUR
SHIFT SUMMARY
 
LYING IN SEMI FOWLERS WITH EYES CLOSED.  HAS DONE WELL THIS SHIFT.  DENIES
FURTHER NEEDS OR WANTS AT THIS TIME.  STATES THAT HE IS READY TO GO HOME.
SAFETY MEASURES IN PLACE.  WILL GIVE HAND OFF TO ONCOMING SHIFT USING SBAR.

## 2019-10-21 NOTE — NUR
DISCHARGE
D/C INSTRUCTIONS GIVEN TO PT BY RN. HARD SCRIPS SENT WITH PT. REPORTS NO
FURTHER QUESTIONS OR CONCERNS AT THIS TIME. PT TOLERATING FOOD, FLUIDS,
VOIDING, AMBULATES IND. PT LEFT AMBULATING IND. WITH FAMILY. PERSONAL
BELONGINGS SENT HOME WITH PT.

## 2021-03-03 DIAGNOSIS — Z23 NEED FOR VACCINATION: ICD-10-CM
